# Patient Record
Sex: MALE | URBAN - METROPOLITAN AREA
[De-identification: names, ages, dates, MRNs, and addresses within clinical notes are randomized per-mention and may not be internally consistent; named-entity substitution may affect disease eponyms.]

---

## 2021-02-08 ENCOUNTER — MOHS SURGERY-ROUTINE (OUTPATIENT)
Dept: URBAN - METROPOLITAN AREA CLINIC 12 | Facility: CLINIC | Age: 71
Setting detail: DERMATOLOGY
End: 2021-02-08

## 2021-02-08 DIAGNOSIS — Z48.02 ENCOUNTER FOR REMOVAL OF SUTURES: ICD-10-CM

## 2021-02-08 PROCEDURE — 17313 MOHS 1 STAGE T/A/L: CPT

## 2022-02-22 ENCOUNTER — NEW PATIENT (OUTPATIENT)
Dept: URBAN - METROPOLITAN AREA CLINIC 14 | Facility: CLINIC | Age: 72
End: 2022-02-22

## 2022-02-22 DIAGNOSIS — H02.423: ICD-10-CM

## 2022-02-22 PROCEDURE — 92285 EXTERNAL OCULAR PHOTOGRAPHY: CPT

## 2022-02-22 PROCEDURE — 99204 OFFICE O/P NEW MOD 45 MIN: CPT

## 2022-02-22 ASSESSMENT — VISUAL ACUITY
OS_SC: 20/25
OD_SC: 20/30

## 2022-02-22 NOTE — PATIENT DISCUSSION
SIGNIFICANT PTOSIS, BOTH UPPER EYELIDS; RECOMMEND LEVATOR ADVANCEMENT, BOTH UPPER EYELIDS. I have examined the patient and reviewed the photos and visual fields.  The upper eyelid margin in ptosis obstructs the visual axis causing  impairment of the peripheral visual field which improves with taping.  I have discussed with the patient the option of levator advancement surgery to lift the upper eyelid position and improve the functional visual field.  We have discussed the risks and benefits of the surgery at length as well as the location of the incision and the recovery process.  The patient understands the surgery, has had all questions answered   and desires to proceed with the surgery as explained.

## 2022-08-10 PROBLEM — N13.8 BENIGN PROSTATIC HYPERPLASIA WITH URINARY OBSTRUCTION: Status: ACTIVE | Noted: 2022-08-10

## 2022-08-10 PROBLEM — N52.8 OTHER MALE ERECTILE DYSFUNCTION: Status: ACTIVE | Noted: 2022-08-10

## 2022-08-10 PROBLEM — G45.4 TRANSIENT GLOBAL AMNESIA: Status: ACTIVE | Noted: 2022-08-10

## 2022-08-10 PROBLEM — N40.1 BENIGN PROSTATIC HYPERPLASIA WITH URINARY OBSTRUCTION: Status: ACTIVE | Noted: 2022-08-10

## 2022-08-10 PROBLEM — E78.5 DYSLIPIDEMIA: Status: ACTIVE | Noted: 2022-08-10

## 2023-01-31 ENCOUNTER — POST-OP (OUTPATIENT)
Dept: URBAN - METROPOLITAN AREA CLINIC 14 | Facility: CLINIC | Age: 73
End: 2023-01-31

## 2023-01-31 DIAGNOSIS — Z98.890: ICD-10-CM

## 2023-01-31 DIAGNOSIS — H02.423: ICD-10-CM

## 2023-01-31 PROCEDURE — 99024 POSTOP FOLLOW-UP VISIT: CPT

## 2023-01-31 ASSESSMENT — VISUAL ACUITY
OS_SC: 20/25
OD_SC: 20/30